# Patient Record
Sex: FEMALE | Race: ASIAN | NOT HISPANIC OR LATINO
[De-identification: names, ages, dates, MRNs, and addresses within clinical notes are randomized per-mention and may not be internally consistent; named-entity substitution may affect disease eponyms.]

---

## 2018-08-08 ENCOUNTER — APPOINTMENT (OUTPATIENT)
Dept: ORTHOPEDIC SURGERY | Facility: CLINIC | Age: 43
End: 2018-08-08
Payer: COMMERCIAL

## 2018-08-08 VITALS
WEIGHT: 133 LBS | BODY MASS INDEX: 22.16 KG/M2 | HEIGHT: 65 IN | HEART RATE: 76 BPM | SYSTOLIC BLOOD PRESSURE: 95 MMHG | DIASTOLIC BLOOD PRESSURE: 62 MMHG

## 2018-08-08 DIAGNOSIS — Z78.9 OTHER SPECIFIED HEALTH STATUS: ICD-10-CM

## 2018-08-08 DIAGNOSIS — Z86.39 PERSONAL HISTORY OF OTHER ENDOCRINE, NUTRITIONAL AND METABOLIC DISEASE: ICD-10-CM

## 2018-08-08 DIAGNOSIS — M25.562 PAIN IN LEFT KNEE: ICD-10-CM

## 2018-08-08 DIAGNOSIS — Z87.09 PERSONAL HISTORY OF OTHER DISEASES OF THE RESPIRATORY SYSTEM: ICD-10-CM

## 2018-08-08 DIAGNOSIS — M84.362A STRESS FRACTURE, LEFT TIBIA, INITIAL ENCOUNTER FOR FRACTURE: ICD-10-CM

## 2018-08-08 PROBLEM — Z00.00 ENCOUNTER FOR PREVENTIVE HEALTH EXAMINATION: Status: ACTIVE | Noted: 2018-08-08

## 2018-08-08 PROCEDURE — 73562 X-RAY EXAM OF KNEE 3: CPT | Mod: LT

## 2018-08-08 PROCEDURE — 99204 OFFICE O/P NEW MOD 45 MIN: CPT

## 2018-08-24 ENCOUNTER — APPOINTMENT (OUTPATIENT)
Dept: ORTHOPEDIC SURGERY | Facility: CLINIC | Age: 43
End: 2018-08-24
Payer: COMMERCIAL

## 2018-08-24 VITALS
WEIGHT: 133 LBS | BODY MASS INDEX: 22.16 KG/M2 | HEART RATE: 72 BPM | SYSTOLIC BLOOD PRESSURE: 97 MMHG | HEIGHT: 65 IN | DIASTOLIC BLOOD PRESSURE: 66 MMHG

## 2018-08-24 DIAGNOSIS — M17.12 UNILATERAL PRIMARY OSTEOARTHRITIS, LEFT KNEE: ICD-10-CM

## 2018-08-24 DIAGNOSIS — M23.304 OTHER MENISCUS DERANGEMENTS, UNSPECIFIED MEDIAL MENISCUS, LEFT KNEE: ICD-10-CM

## 2018-08-24 PROCEDURE — 99214 OFFICE O/P EST MOD 30 MIN: CPT

## 2018-08-24 RX ORDER — TRAMADOL HYDROCHLORIDE 50 MG/1
50 TABLET, COATED ORAL 3 TIMES DAILY
Qty: 90 | Refills: 0 | Status: ACTIVE | COMMUNITY
Start: 2018-08-24 | End: 1900-01-01

## 2018-09-05 PROBLEM — M23.304 DERANGEMENT OF MEDIAL MENISCUS OF LEFT KNEE: Status: ACTIVE | Noted: 2018-09-05

## 2018-09-26 ENCOUNTER — RX RENEWAL (OUTPATIENT)
Age: 43
End: 2018-09-26

## 2018-09-26 RX ORDER — MELOXICAM 15 MG/1
15 TABLET ORAL
Qty: 30 | Refills: 0 | Status: ACTIVE | COMMUNITY
Start: 2018-08-24 | End: 1900-01-01

## 2022-08-01 ENCOUNTER — EMERGENCY (EMERGENCY)
Facility: HOSPITAL | Age: 47
LOS: 1 days | Discharge: HOME | End: 2022-08-01
Attending: EMERGENCY MEDICINE | Admitting: EMERGENCY MEDICINE

## 2022-08-01 VITALS
TEMPERATURE: 98 F | RESPIRATION RATE: 16 BRPM | OXYGEN SATURATION: 100 % | HEART RATE: 75 BPM | SYSTOLIC BLOOD PRESSURE: 113 MMHG | DIASTOLIC BLOOD PRESSURE: 66 MMHG

## 2022-08-01 VITALS
HEART RATE: 90 BPM | RESPIRATION RATE: 18 BRPM | HEIGHT: 65 IN | TEMPERATURE: 98 F | DIASTOLIC BLOOD PRESSURE: 44 MMHG | OXYGEN SATURATION: 98 % | SYSTOLIC BLOOD PRESSURE: 100 MMHG

## 2022-08-01 LAB
ALBUMIN SERPL ELPH-MCNC: 4.3 G/DL — SIGNIFICANT CHANGE UP (ref 3.3–5)
ALP SERPL-CCNC: 106 U/L — SIGNIFICANT CHANGE UP (ref 40–120)
ALT FLD-CCNC: 12 U/L — SIGNIFICANT CHANGE UP (ref 4–33)
ANION GAP SERPL CALC-SCNC: 11 MMOL/L — SIGNIFICANT CHANGE UP (ref 7–14)
AST SERPL-CCNC: 15 U/L — SIGNIFICANT CHANGE UP (ref 4–32)
BASOPHILS # BLD AUTO: 0.05 K/UL — SIGNIFICANT CHANGE UP (ref 0–0.2)
BASOPHILS NFR BLD AUTO: 0.7 % — SIGNIFICANT CHANGE UP (ref 0–2)
BILIRUB SERPL-MCNC: 0.4 MG/DL — SIGNIFICANT CHANGE UP (ref 0.2–1.2)
BUN SERPL-MCNC: 10 MG/DL — SIGNIFICANT CHANGE UP (ref 7–23)
CALCIUM SERPL-MCNC: 9.6 MG/DL — SIGNIFICANT CHANGE UP (ref 8.4–10.5)
CHLORIDE SERPL-SCNC: 105 MMOL/L — SIGNIFICANT CHANGE UP (ref 98–107)
CO2 SERPL-SCNC: 25 MMOL/L — SIGNIFICANT CHANGE UP (ref 22–31)
CREAT SERPL-MCNC: 0.69 MG/DL — SIGNIFICANT CHANGE UP (ref 0.5–1.3)
EGFR: 106 ML/MIN/1.73M2 — SIGNIFICANT CHANGE UP
EOSINOPHIL # BLD AUTO: 0.06 K/UL — SIGNIFICANT CHANGE UP (ref 0–0.5)
EOSINOPHIL NFR BLD AUTO: 0.9 % — SIGNIFICANT CHANGE UP (ref 0–6)
GLUCOSE SERPL-MCNC: 82 MG/DL — SIGNIFICANT CHANGE UP (ref 70–99)
HCT VFR BLD CALC: 36.5 % — SIGNIFICANT CHANGE UP (ref 34.5–45)
HGB BLD-MCNC: 12.8 G/DL — SIGNIFICANT CHANGE UP (ref 11.5–15.5)
IANC: 3.83 K/UL — SIGNIFICANT CHANGE UP (ref 1.8–7.4)
IMM GRANULOCYTES NFR BLD AUTO: 0.1 % — SIGNIFICANT CHANGE UP (ref 0–1.5)
LYMPHOCYTES # BLD AUTO: 2.32 K/UL — SIGNIFICANT CHANGE UP (ref 1–3.3)
LYMPHOCYTES # BLD AUTO: 34.2 % — SIGNIFICANT CHANGE UP (ref 13–44)
MCHC RBC-ENTMCNC: 31.4 PG — SIGNIFICANT CHANGE UP (ref 27–34)
MCHC RBC-ENTMCNC: 35.1 GM/DL — SIGNIFICANT CHANGE UP (ref 32–36)
MCV RBC AUTO: 89.5 FL — SIGNIFICANT CHANGE UP (ref 80–100)
MONOCYTES # BLD AUTO: 0.52 K/UL — SIGNIFICANT CHANGE UP (ref 0–0.9)
MONOCYTES NFR BLD AUTO: 7.7 % — SIGNIFICANT CHANGE UP (ref 2–14)
NEUTROPHILS # BLD AUTO: 3.83 K/UL — SIGNIFICANT CHANGE UP (ref 1.8–7.4)
NEUTROPHILS NFR BLD AUTO: 56.4 % — SIGNIFICANT CHANGE UP (ref 43–77)
NRBC # BLD: 0 /100 WBCS — SIGNIFICANT CHANGE UP
NRBC # FLD: 0 K/UL — SIGNIFICANT CHANGE UP
PLATELET # BLD AUTO: 210 K/UL — SIGNIFICANT CHANGE UP (ref 150–400)
POTASSIUM SERPL-MCNC: 4.4 MMOL/L — SIGNIFICANT CHANGE UP (ref 3.5–5.3)
POTASSIUM SERPL-SCNC: 4.4 MMOL/L — SIGNIFICANT CHANGE UP (ref 3.5–5.3)
PROT SERPL-MCNC: 7.5 G/DL — SIGNIFICANT CHANGE UP (ref 6–8.3)
RBC # BLD: 4.08 M/UL — SIGNIFICANT CHANGE UP (ref 3.8–5.2)
RBC # FLD: 12.8 % — SIGNIFICANT CHANGE UP (ref 10.3–14.5)
SODIUM SERPL-SCNC: 141 MMOL/L — SIGNIFICANT CHANGE UP (ref 135–145)
TROPONIN T, HIGH SENSITIVITY RESULT: <6 NG/L — SIGNIFICANT CHANGE UP
WBC # BLD: 6.79 K/UL — SIGNIFICANT CHANGE UP (ref 3.8–10.5)
WBC # FLD AUTO: 6.79 K/UL — SIGNIFICANT CHANGE UP (ref 3.8–10.5)

## 2022-08-01 PROCEDURE — 73110 X-RAY EXAM OF WRIST: CPT | Mod: 26,LT

## 2022-08-01 PROCEDURE — 73564 X-RAY EXAM KNEE 4 OR MORE: CPT | Mod: 26,RT

## 2022-08-01 PROCEDURE — 99285 EMERGENCY DEPT VISIT HI MDM: CPT

## 2022-08-01 RX ORDER — IBUPROFEN 200 MG
400 TABLET ORAL ONCE
Refills: 0 | Status: COMPLETED | OUTPATIENT
Start: 2022-08-01 | End: 2022-08-01

## 2022-08-01 RX ORDER — ACETAMINOPHEN 500 MG
650 TABLET ORAL ONCE
Refills: 0 | Status: COMPLETED | OUTPATIENT
Start: 2022-08-01 | End: 2022-08-01

## 2022-08-01 RX ADMIN — Medication 400 MILLIGRAM(S): at 10:46

## 2022-08-01 RX ADMIN — Medication 650 MILLIGRAM(S): at 10:46

## 2022-08-01 NOTE — ED PROVIDER NOTE - PHYSICAL EXAMINATION
Right knee: warm to touch with mild swelling and tenderness. limited ROM due to pain. no skin changes.  pulses  intact.   Left wrist: FROM, pulses intact. no skin changes.

## 2022-08-01 NOTE — ED ADULT TRIAGE NOTE - CHIEF COMPLAINT QUOTE
Pt c/o right knee pain x 4 days, denies injury or trauma. Pt also, c/o left wrist pain with movement x few weeks. PMH HLD and bilateral meniscus tear

## 2022-08-01 NOTE — ED PROCEDURE NOTE - PROCEDURE ADDITIONAL DETAILS
Focused ED Ultrasound of ( right    )  lower extremity  [address] - Indication - Leg pain    In both grey scale and color doppler from common femoral vein into superficial and deep femoral vein, and again at the popliteal vein inferiorly past  the trifurcation:  full compression at all sites, with normal doppler flow.  No visualized thrombus.  Recommend repeat ultrasound in 5 - 7 days.     Impression: no proximal DVT (  right   ) lower extremity.  Small R knee effusion seen.   Dexeus [id number]

## 2022-08-01 NOTE — ED PROVIDER NOTE - CLINICAL SUMMARY MEDICAL DECISION MAKING FREE TEXT BOX
48 yo female with right knee pain and left wrist pain. the right knee with swelling and warm to touch. los suspicion for septic joint. most likely inflammatory.   will obtain xray to r/o any lobo pathology of knee and wrist. pain control.

## 2022-08-01 NOTE — ED ADULT NURSE NOTE - OBJECTIVE STATEMENT
Patient is a 48 yo female, h/x HLD, GERD, presenting with R knee pain x 10 days. AAOx4, no signs of distress, denies injury, knee is swollen and warm to touch. Patient also reports weakness, fatigue and dizziness x ~1 month. Also brings with her recent x-ray results on L wrist, states she is waiting for MRI appointment. Denies chest pain, shortness of breath. Medicated for pain. Sent to x-ray. Fall precautions maintained.
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
## 2022-08-01 NOTE — ED PROVIDER NOTE - NS ED ATTENDING STATEMENT MOD
This was a shared visit with the AZAR. I reviewed and verified the documentation and independently performed the documented:

## 2022-08-01 NOTE — ED PROVIDER NOTE - PROGRESS NOTE DETAILS
Pt states she has been having weakness all over her body for the last 2 months. she also endorses having intermittent dizziness not associated with change in position. she states the episodes happen at random and have been improving.   will obtains an EKG, labs ro r/o anemia, trop. Pt reassessed at bedside, feels well, pain controlled. Informed of workup in ED, reviewed lab and/or radiology results (when applicable) with patient/caregiver. Informed pt of plan for discharge with instructions to follow up with PMD. Pt/caregiver expressed understanding of plan and agrees with plan for discharge. Strict return precautions discussed with patient in layman's terms, patient demonstrated understanding of return precautions. Jacek Prater PA-C

## 2022-08-01 NOTE — ED PROVIDER NOTE - PATIENT PORTAL LINK FT
You can access the FollowMyHealth Patient Portal offered by [facility] by registering at the following website: [email or web address]. By joining [email or web address]’s FollowMyHealth portal, you will also be able to view your health information using other applications (apps) compatible with our system.
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
## 2022-08-01 NOTE — ED PROVIDER NOTE - NSFOLLOWUPINSTRUCTIONS_ED_ALL_ED_FT
Advance activity as tolerated.  Continue all previously prescribed medications as directed unless otherwise instructed.  Follow up with your primary care physician in 48-72 hours- bring copies of your results.  Return to the ER for worsening or persistent symptoms, and/or ANY NEW OR CONCERNING SYMPTOMS. If you have issues obtaining follow up, please call: [phone] to obtain a doctor or specialist who takes your insurance in your area.  You may call [phone] to make an appointment with the internal medicine clinic.

## 2022-08-04 ENCOUNTER — APPOINTMENT (OUTPATIENT)
Dept: ORTHOPEDIC SURGERY | Facility: CLINIC | Age: 47
End: 2022-08-04

## 2022-08-04 DIAGNOSIS — M25.561 PAIN IN RIGHT KNEE: ICD-10-CM

## 2022-08-04 PROCEDURE — 99203 OFFICE O/P NEW LOW 30 MIN: CPT

## 2022-08-04 RX ORDER — NAPROXEN 500 MG/1
500 TABLET ORAL
Qty: 50 | Refills: 1 | Status: ACTIVE | COMMUNITY
Start: 2022-08-04 | End: 1900-01-01

## 2022-08-04 NOTE — REVIEW OF SYSTEMS
Left voicemail for patient's family indicating that due to covid-19 we are only seeing urgent patients in clinic. Please call us to coordinate converting the appointment on  5/1/2020 into a video appointment. We need an e-mail address and we need to set up [employer] access. Clinic phone number was provided.        Arely Fermin       [Joint Pain] : joint pain

## 2022-08-11 ENCOUNTER — APPOINTMENT (OUTPATIENT)
Dept: ORTHOPEDIC SURGERY | Facility: CLINIC | Age: 47
End: 2022-08-11

## 2022-08-11 PROBLEM — M25.561 KNEE PAIN, RIGHT: Status: ACTIVE | Noted: 2022-08-11

## 2022-08-11 PROCEDURE — 99214 OFFICE O/P EST MOD 30 MIN: CPT
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
## 2022-08-11 NOTE — PHYSICAL EXAM
[de-identified] : Oriented to time, place, person\par Mood: Normal\par Affect: Normal\par Appearance: Healthy, well appearing, no acute distress.\par Gait: Antalgic \par Assistive Devices: Cane\par \par Right Knee Exam:\par \par Skin: Clean, dry, intact\par Inspection: No obvious malalignment, no masses, no swelling, mild effusion\par Pulses: 2+ DP/PT pulses \par ROM: 0-90 degrees of flexion. Severe pain with knee flexion, apprehensive. \par Tenderness: Hypersensitivity to touch. +MJLT\par Stability: Stable to varus, valgus. Negative Lachman testing. Negative anterior drawer, negative posterior drawer.\par Strength: 5/5 Q/H/TA/GS/EHL, without atrophy\par Neuro: Intact to light touch throughout, DTRs normal\par Additional Tests: Negative Jie's test, Negative patellar grind test  [de-identified] : Images were reviewed from ER dated 8.1.2022\par \par 4 views of the right knee that show no acute fracture or dislocation. There is no medial, no lateral and no patellofemoral degenerative changes seen. There is no significant malalignment. No significant other obvious osseous abnormality, otherwise unremarkable. \par \par MRI right knee dated 8.5.2022 shows small undersurface tear of the medial meniscus. Mild MCL sprain.

## 2022-08-11 NOTE — DISCUSSION/SUMMARY
[de-identified] : 46 y/o female with right knee MMT\par \par Patient presents for follow-up of right knee pain with MRI shows shows small undersurface tear of the medial meniscus as well as evidence of mild irritation of the medial collateral ligament.  We discussed that this tear is peripheral in nature and does not require urgent surgical intervention.  We discussed that conservative management is indicated at this time. \par \par Recommendations: Begin trial of PT, Rx given. Conservative care & observation, this includes rest/activity avoidance until less symptomatic with subsequent gradual return to full activity as tolerated. Patient may also use OTC NSAID's or acetaminophen as tolerated, with application of ice to the area 2-3x daily for 20 minutes after periods of activity. \par \par Follow-up 6-8 weeks.

## 2022-08-11 NOTE — ADDENDUM
[FreeTextEntry1] : This note was written by Gayathri Reyes on 08/04/2022 acting solely as a scribe for Dr. Felipe Araujo.\par \par All medical record entries made by the Scribe were at my, Dr. Felipe Araujo, direction and personally dictated by me on 08/04/2022. I have personally reviewed the chart and agree that the record accurately reflects my personal performance of the history, physical exam, assessment and plan.

## 2022-08-11 NOTE — ADDENDUM
[FreeTextEntry1] : This note was written by Gayathri Reyes on 08/11/2022 acting solely as a scribe for Dr. Felipe Araujo.\par \par All medical record entries made by the Scribe were at my, Dr. Felipe Arajuo, direction and personally dictated by me on 08/11/2022. I have personally reviewed the chart and agree that the record accurately reflects my personal performance of the history, physical exam, assessment and plan.

## 2022-08-11 NOTE — DISCUSSION/SUMMARY
[de-identified] : 48 y/o female with right knee pain. \par \par Patient presents for evaluation of right knee pain. The patient's has hypersensitivity to the knee and examination is limited due to her pain. Discussed with the patient in detail the concern for underlying internal derangement to the meniscus and possible treatment options that may include conservative management (e.g. RICE, activity modification, PT, injection therapy) versus operative arthroscopy. Discussed that treatment would likely depend on the nature of the injury, quality of the chondral surfaces (degree of arthrosis) as seen on MRI imaging.  \par \par Recommendation: Rest, ice, compression, elevation (RICE) and OTC NSAID's as instructed until MRI imaging.\par \par Follow up after MRI.

## 2022-08-11 NOTE — PHYSICAL EXAM
[de-identified] : Oriented to time, place, person\par Mood: Normal\par Affect: Normal\par Appearance: Healthy, well appearing, no acute distress.\par Gait: Normal\par Assistive Devices: None\par \par Right Knee Exam:\par \par Skin: Clean, dry, intact\par Inspection: No obvious malalignment, no masses, no swelling, mild effusion\par Pulses: 2+ DP/PT pulses \par ROM: 0-30 degrees of flexion. Severe pain with knee flexion, apprehensive. \par Tenderness: Hypersensitivity to touch. \par Stability: Stable to varus, valgus. Negative Lachman testing. Negative anterior drawer, negative posterior drawer.\par Strength: 5/5 Q/H/TA/GS/EHL, without atrophy\par Neuro: Intact to light touch throughout, DTRs normal\par Additional Tests: +Jie's test, Negative patellar grind test  [de-identified] : Images were reviewed from ER dated 8.1.2022\par \par 4 views of the right knee were obtained today, 08/04/2022, that show no acute fracture or dislocation. There is no medial, no lateral and no patellofemoral degenerative changes seen. There is no significant malalignment. No significant other obvious osseous abnormality, otherwise unremarkable.

## 2022-09-26 ENCOUNTER — APPOINTMENT (OUTPATIENT)
Dept: ORTHOPEDIC SURGERY | Facility: CLINIC | Age: 47
End: 2022-09-26

## 2022-09-26 VITALS
BODY MASS INDEX: 20.99 KG/M2 | HEIGHT: 65 IN | DIASTOLIC BLOOD PRESSURE: 80 MMHG | SYSTOLIC BLOOD PRESSURE: 100 MMHG | HEART RATE: 72 BPM | WEIGHT: 126 LBS

## 2022-09-26 DIAGNOSIS — S83.231D COMPLEX TEAR OF MEDIAL MENISCUS, CURRENT INJURY, RIGHT KNEE, SUBSEQUENT ENCOUNTER: ICD-10-CM

## 2022-09-26 PROCEDURE — 99213 OFFICE O/P EST LOW 20 MIN: CPT

## 2022-09-30 PROBLEM — S83.231D COMPLEX TEAR OF MEDIAL MENISCUS OF RIGHT KNEE AS CURRENT INJURY, SUBSEQUENT ENCOUNTER: Status: ACTIVE | Noted: 2022-08-11

## 2022-09-30 NOTE — ADDENDUM
[FreeTextEntry1] : This note was written by Gayathri Reyes on 09/26/2022 acting solely as a scribe for Dr. Felipe Araujo.\par \par All medical record entries made by the Scribe were at my, Dr. Felipe Araujo, direction and personally dictated by me on 09/26/2022. I have personally reviewed the chart and agree that the record accurately reflects my personal performance of the history, physical exam, assessment and plan.

## 2022-09-30 NOTE — DISCUSSION/SUMMARY
[de-identified] : 48 y/o female with right knee MMT\par \par Patient presents for follow-up of right knee pain with MRI that shows small undersurface tear of the medial meniscus as well as evidence of mild irritation of the medial collateral ligament.  She reports good relief under the guidance of physical therapy. We discussed that this tear is peripheral in nature and does not require surgical arthroscopy if she is continuing to improve with conservative management. \par \par Recommendations: Continue PT, new Rx given. Gradual discontinuation of cane and brace as able. NSAIDs/Ice prn. \par \par Follow-up 2 months.
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
## 2022-09-30 NOTE — PHYSICAL EXAM
[de-identified] : Oriented to time, place, person\par Mood: Normal\par Affect: Normal\par Appearance: Healthy, well appearing, no acute distress.\par Gait: Antalgic \par Assistive Devices: Cane\par \par Right Knee Exam:\par \par Skin: Clean, dry, intact\par Inspection: No obvious malalignment, no masses, no swelling, mild effusion\par Pulses: 2+ DP/PT pulses \par ROM: 0-115 degrees of flexion. +pain with knee flexion\par Tenderness: Hypersensitivity to touch. +MJLT\par Stability: Stable to varus, valgus. Negative Lachman testing. Negative anterior drawer, negative posterior drawer.\par Strength: 5/5 Q/H/TA/GS/EHL, without atrophy\par Neuro: Intact to light touch throughout, DTRs normal\par Additional Tests: Negative Jie's test, Negative patellar grind test  [de-identified] : Images were reviewed from ER dated 8.1.2022\par \par 4 views of the right knee that show no acute fracture or dislocation. There is no medial, no lateral and no patellofemoral degenerative changes seen. There is no significant malalignment. No significant other obvious osseous abnormality, otherwise unremarkable. \par \par MRI right knee dated 8.5.2022 shows small undersurface tear of the medial meniscus. Mild MCL sprain. We independently reviewed and discussed in detail the images and the radiologic reports with the patient.

## 2022-10-10 ENCOUNTER — EMERGENCY (EMERGENCY)
Facility: HOSPITAL | Age: 47
LOS: 1 days | Discharge: HOME | End: 2022-10-10
Attending: EMERGENCY MEDICINE
Payer: COMMERCIAL

## 2022-10-10 VITALS
SYSTOLIC BLOOD PRESSURE: 132 MMHG | DIASTOLIC BLOOD PRESSURE: 74 MMHG | OXYGEN SATURATION: 99 % | HEART RATE: 86 BPM | HEIGHT: 65 IN | WEIGHT: 128.97 LBS | TEMPERATURE: 100 F | RESPIRATION RATE: 19 BRPM

## 2022-10-10 VITALS
TEMPERATURE: 98 F | RESPIRATION RATE: 18 BRPM | SYSTOLIC BLOOD PRESSURE: 119 MMHG | DIASTOLIC BLOOD PRESSURE: 76 MMHG | HEART RATE: 76 BPM | OXYGEN SATURATION: 99 %

## 2022-10-10 PROCEDURE — 99284 EMERGENCY DEPT VISIT MOD MDM: CPT

## 2022-10-10 RX ORDER — IBUPROFEN 200 MG
400 TABLET ORAL ONCE
Refills: 0 | Status: COMPLETED | OUTPATIENT
Start: 2022-10-10 | End: 2022-10-10

## 2022-10-10 RX ORDER — OXYCODONE HYDROCHLORIDE 5 MG/1
1 TABLET ORAL
Qty: 5 | Refills: 0
Start: 2022-10-10

## 2022-10-10 RX ORDER — OXYCODONE HYDROCHLORIDE 5 MG/1
5 TABLET ORAL ONCE
Refills: 0 | Status: DISCONTINUED | OUTPATIENT
Start: 2022-10-10 | End: 2022-10-10

## 2022-10-10 RX ADMIN — Medication 1 TABLET(S): at 20:25

## 2022-10-10 RX ADMIN — OXYCODONE HYDROCHLORIDE 5 MILLIGRAM(S): 5 TABLET ORAL at 20:15

## 2022-10-10 RX ADMIN — Medication 400 MILLIGRAM(S): at 20:15

## 2022-10-10 NOTE — ED PROVIDER NOTE - PATIENT PORTAL LINK FT
You can access the FollowMyHealth Patient Portal offered by [facility] by registering at the following website: [email or web address]. By joining [employer]’s FollowMyHealth portal, you will also be able to view your health information using other applications (apps) compatible with our system.

## 2022-10-10 NOTE — ED PROVIDER NOTE - ENMT NEGATIVE STATEMENT, MLM
Ears: +Ear pain and muffled hearing as per HPI. Nose: no nasal congestion and no nasal drainage. Mouth/Throat: +Tooth pain as per HPI. no hoarseness and no throat pain. Neck: no lumps, no pain, no stiffness and no swollen glands. Ears: +Ear pain. Nose: no nasal congestion and no nasal drainage. Mouth/Throat: +Tooth pain as per HPI. no hoarseness and no throat pain. Neck: no lumps, no pain, no stiffness and no swollen glands.

## 2022-10-10 NOTE — ED PROVIDER NOTE - NSFOLLOWUPINSTRUCTIONS_ED_ALL_ED_FT
You were seen in the emergency department for dental pain.  Please read all attached patient information, read all additional instructions below, and follow-up with all providers as directed.    1) Follow up with dental office on WEDNESDAY for likely tooth extraction. Call [phone] and tell them you are an emergency department patient. Follow-up with your primary care provider in 2-3 days.    2) Continue to take all medications as prescribed. Take your antibiotics that were prescribed    3) Rest and stay hydrated. Pain can be managed with Acetaminophen (aka Tylenol) and Ibuprofen (aka Motrin or Advil) over the counter as directed.    4) Return to the ER for any new or worsening symptoms.      Please read all attached patient information.        Dental Abscess  WHAT YOU NEED TO KNOW:  What is a dental abscess? A dental abscess is a collection of pus in or around a tooth. A dental abscess is caused by bacteria. The bacteria usually enter the tooth when the enamel (outer part of the tooth) is damaged by tooth decay. Bacteria may also enter the tooth through a break or chip in the tooth, or a cut in the gum. Food particles that are stuck between the teeth for a long time may also lead to an abscess.   What increases my risk for a dental abscess?   •Poor tooth care  •Medical conditions, such as diabetes, gastric reflux, or diseases that weaken the immune system   •Procedures on the tooth or the gums  •Dry mouth or very little saliva   •Smoking or drinking alcohol  •Radiation therapy of the head and neck  •Certain medicines, such as steroids, allergy, or blood pressure medicines  What are the signs and symptoms of a dental abscess?   •Toothache, a loose tooth, or a tooth that is very sensitive to pressure or temperature  •Bad breath, unpleasant taste, and drooling  •Fever  •Pain, redness, and swelling of the gums, or swelling of your face and neck  •Pain when you open or close your mouth  •Trouble opening your mouth  How is a dental abscess diagnosed? Your healthcare provider will examine your teeth and gums. He or she will check for pus, redness, swelling, or a mass. You may need an x-ray to check for infection in deeper tissues or broken teeth.   How is a dental abscess treated? Treatment helps treat your abscess and prevent more serious problems.  •Medicines may be given to treat a bacterial infection and decrease pain.   •Incision and drainage is a cut in the abscess to allow the pus to drain. A sample of fluid may be collected from your abscess. The fluid is sent to a lab and tested for bacteria. Ask your healthcare provider for more information.  •A root canal is a procedure to remove the bacteria and prevent more infection. It is usually done after an incision and drainage. A filling or crown will be placed over the tooth after you have healed from your root canal.   •Tooth removal may be needed if the infection affects deeper tissues. This is usually done after an incision and drainage.   What can I do to care for myself?   •Rinse your mouth every 2 hours with salt water. This will help keep the area clean.   •Gently brush your teeth twice a day with a soft tooth brush. This will help keep the area clean.   •Eat soft foods as directed. Soft foods may cause less pain. Examples include applesauce, yogurt, and cooked pasta. Ask your healthcare provider how long to follow this instruction.   •Apply a warm compress to your tooth or gum. Use a cotton ball or gauze soaked in warm water. Remove the compress in 10 minutes or when it becomes cool. Repeat 3 times a day.   What can I do to prevent another dental abscess?   •Brush your teeth at least 2 times a day with fluoride toothpaste.  •Use dental floss to clean between your teeth at least once a day.  •Rinse your mouth with water or mouthwash after meals and snacks.   •Chew sugarless gum after meals and snacks.  •Limit foods that are sticky and high in sugar such as raisons. Also limit drinks high in sugar, such as soda.   •See your dentist every 6 months for dental cleanings and oral exams.  When should I seek immediate care?   •You have severe pain.  •You have trouble breathing because of pain or swelling.  When should I contact my healthcare provider?   •Your symptoms get worse, even after treatment.  •Your mouth is bleeding.  •You cannot eat or drink because of pain or swelling.  •Your abscess returns.  •You have an injury that causes a crack in your tooth.  •You have questions or concerns about your condition or care.  CARE AGREEMENT:  You have the right to help plan your care. Learn about your health condition and how it may be treated. Discuss treatment options with your healthcare providers to decide what care you want to receive. You always have the right to refuse treatment.

## 2022-10-10 NOTE — ED PROVIDER NOTE - NSFOLLOWUPCLINICS_GEN_ALL_ED_FT
[facility]  Dental  [address] [place] [id number]  Phone: [phone]  Fax:   Scheduled Appointment: 10/12/2022

## 2022-10-10 NOTE — ED PROVIDER NOTE - ENMT, MLM
Airway patent, Nasal mucosa clear. Limited ability to open jaw due to pain. Mouth with dark/black discoloration of L upper posterior molar and swelling of surrounding gingiva.  Throat has no vesicles, no oropharyngeal exudates and uvula is midline. Tympanic membrane without inflammation bilaterally. Airway patent, Nasal mucosa clear.  Throat has no vesicles, no oropharyngeal exudates and uvula is midline. Tympanic membrane without inflammation bilaterally. Moderate pain when opening mouth. +Bristol tooth sprouting under gingiva on the R. No fluctuant mass +tenderness with palpation under right jaw.

## 2022-10-10 NOTE — ED PROVIDER NOTE - PROGRESS NOTE DETAILS
Sindhu Torres: spoke with Dental -- recommended outpatient extraction on Wednesday if no fluctuant mass. Can dc on abx and pain medications Isaiah Montes): 46 y/o F w/ PMHx chronic knee pain p/w 2 days of R sided dental pain. Pt reports no prior similar episode and regular dental hygiene. Pt took Tylenol and had some relief but decided to present here. No drainage. No bleeding. No numbness over mouth. With pain control, pt is able to chew. No smoking. No alcohol. No allergies. Exam notable for well appearing, afebrile, stable vital signs. Oral exam notable for: No TMJ tenderness. Intact oral pharyngeal mucosa and R upper and lower wisdom teeth outgrown but not fully exposed or in malposition. Low concern for abscess or other periapical infection of mouth or wisdom teeth. Plan for oral antibiotics, pain control, IR oxy and refer to dental clinic.

## 2022-10-10 NOTE — ED PROVIDER NOTE - CLINICAL SUMMARY MEDICAL DECISION MAKING FREE TEXT BOX
47 year old woman pmh GERD p/w L upper molar pain x2 days, accompanied by chills, R headache, R ear pain and sensation of heat and muffled hearing in right ear. Vital signs stable, afebrile, but took Tylenol shortly before presentation. Exam shows discoloration and swelling of R upper posterior molar, limited ability to open jaw due to pain. Cranial nerves intact, no focal deficits. Will obtain dental consult. 47 year old woman pmh GERD p/w L upper molar pain x2 days, accompanied by chills, R headache, R ear pain and sensation of heat and muffled hearing in right ear. Vital signs stable, afebrile, but took Tylenol shortly before presentation. Exam shows discoloration and swelling of R upper posterior molar, limited ability to open jaw due to pain. Cranial nerves intact, no focal deficits. Dental abscess vs soft tissue infection. Plan: +ibuprofen and oxycodone. Will obtain dental consult.

## 2022-10-10 NOTE — ED PROVIDER NOTE - SKIN, MLM
Skin normal color for race, warm, dry and intact. No evidence of rash. Skin normal color for race, warm, dry and intact. No evidence of rash. Warm to touch

## 2022-10-10 NOTE — ED PROVIDER NOTE - OBJECTIVE STATEMENT
47 year old female, pmh of GERD who p/w 2 days of R upper molar tooth pain and mouth swelling. She has also been having chills and R-sided headache today. She also reports R ear pain, a sensation of "heat" in her right ear and slightly muffled hearing on the right. No recent history of oral surgery or instrumentation. No hx of dental dz. No recent facial trauma. The pain has prevented her from eating. No nausea/vomiting/diarrhea. No changes in facial sensation. She has never had her wisdom teeth removed. She took Tylenol yesterday with little improvement and took it again in the waiting room. 47 year old female, pmh of GERD who p/w 2 days of R upper molar tooth pain and mouth swelling. She has also been having chills and R-sided headache today. She also reports R ear pain, a sensation of "heat" in her right ear the right. No recent history of oral surgery or instrumentation. No hx of dental dz. No recent facial trauma. The pain has prevented her from eating. No nausea/vomiting/diarrhea. No changes in facial sensation. She has never had her wisdom teeth removed. She took Tylenol yesterday with little improvement and took it again in the waiting room.

## 2022-10-10 NOTE — ED ADULT NURSE NOTE - OBJECTIVE STATEMENT
48y/o F coming to the ED c.o R toothache. 46y/o F coming to the ED c.o R toothache. Pt states that for the past x2 days was experiencing @ upper molar tooth pain & mouth swelling a/w fever & R sided HA. Pt denies any recent oral sx, facial trauma. Pt states took tylenol x2 with relief. On exam, pt is breathing spontaneously, able to speak full sentences w.o difficulty, saturating 98% RA. No drainage noted. VSS.

## 2023-01-10 ENCOUNTER — APPOINTMENT (OUTPATIENT)
Dept: PULMONOLOGY | Facility: CLINIC | Age: 48
End: 2023-01-10

## 2023-10-20 NOTE — ED PROVIDER NOTE - NEURO NEGATIVE STATEMENT, MLM
LM advising pt of day/time of CT.  Oct 27 @ 1pm at [id number] [facility]   +R sided headache. no loss of consciousness, no gait abnormality, no sensory deficits, and no weakness.

## 2023-11-17 ENCOUNTER — EMERGENCY (EMERGENCY)
Facility: HOSPITAL | Age: 48
LOS: 1 days | Discharge: HOME | End: 2023-11-17
Admitting: EMERGENCY MEDICINE
Payer: OTHER MISCELLANEOUS

## 2023-11-17 VITALS
SYSTOLIC BLOOD PRESSURE: 123 MMHG | HEART RATE: 84 BPM | OXYGEN SATURATION: 100 % | DIASTOLIC BLOOD PRESSURE: 78 MMHG | RESPIRATION RATE: 18 BRPM | TEMPERATURE: 98 F

## 2023-11-17 PROCEDURE — 73030 X-RAY EXAM OF SHOULDER: CPT | Mod: 26,LT

## 2023-11-17 PROCEDURE — 99284 EMERGENCY DEPT VISIT MOD MDM: CPT

## 2023-11-17 RX ORDER — IBUPROFEN 200 MG
600 TABLET ORAL ONCE
Refills: 0 | Status: COMPLETED | OUTPATIENT
Start: 2023-11-17 | End: 2023-11-17

## 2023-11-17 RX ADMIN — Medication 600 MILLIGRAM(S): at 16:33

## 2023-11-17 NOTE — ED PROVIDER NOTE - OBJECTIVE STATEMENT
47 y/o F with h/o HLD, pre DM (no meds) pw sudden onset shoulder pain after lifting a heavy box at work today approx 1:30pm. States she works for the [employer]. Denies head trauma, neck pain, back pain, CP, SOB, ab pain, weakness, numbness, tingling. Denies prior injuries to the shoulder. Took Tylenol extra strength when the injury occurred with only mild relief.

## 2023-11-17 NOTE — ED ADULT NURSE NOTE - OBJECTIVE STATEMENT
pt received to [facility] and ambulatory at baseline, c/o L shoulder injury/pain after lifting packages at work at the [employer]. tender to touch, limited ROM from pain, +radial pulses. denies chest pain, sob. no acute distress noted at this time. respirations even and nonlabored on room air. comfort and safety maintained. pt pending XR.

## 2023-11-17 NOTE — ED PROVIDER NOTE - CLINICAL SUMMARY MEDICAL DECISION MAKING FREE TEXT BOX
49 y/o F with h/o HLD, pre DM (no meds) pw sudden onset shoulder pain after lifting a heavy box at work today approx 1:30pm. States she works for the [employer]. Denies head trauma, neck pain, back pain, CP, SOB, ab pain, weakness, numbness, tingling. Denies prior injuries to the shoulder. Took Tylenol extra strength when the injury occurred with only mild relief.  PE: TTP anterior distal shoulder at the rotator cuff insertion. Unable to elevate and abduct. Severe pain. No redness, no swelling, no obvious deformity  Plan: Will check xray shoulder, give Motrin, Ortho outpt follow up ([facility] assisting with appt)  **update: because it is workers comp, patient needs to obtain claim number at work prior to making the Ortho appt 47 y/o F with h/o HLD, pre DM (no meds) pw sudden onset shoulder pain after lifting a heavy box at work today approx 1:30pm. States she works for the [employer]. Denies head trauma, neck pain, back pain, CP, SOB, ab pain, weakness, numbness, tingling. Denies prior injuries to the shoulder. Took Tylenol extra strength when the injury occurred with only mild relief.  PE: TTP anterior distal shoulder at the rotator cuff insertion. Unable to elevate and abduct. Severe pain. No redness, no swelling, no obvious deformity  Plan: Will check xray shoulder, give Motrin, Ortho outpt follow up ([facility] assisting with appt) for further eval and MRI

## 2023-11-17 NOTE — ED PROVIDER NOTE - NSFOLLOWUPINSTRUCTIONS_ED_ALL_ED_FT
Follow up with Ortho within the week- you can call: Find a Physician helpline ([phone]) for assistance   Take Motrin 600mg 1 tab every 6-8 hrs as needed with food for pain.   Keep sling on for support during the day for 1-2 days - remove at night.    Do range of motion exercises during the day.   Worsening pain, numbness, weakness, swelling or new concerning symptoms return to the Emergency Department.     Shoulder Sprain  A shoulder sprain is a partial or complete tear in one of the tough, fiber-like tissues (ligaments) in the shoulder. The ligaments in the shoulder help to hold the shoulder in place.  What are the causes?  This condition may be caused by:  A fall.A hit to the shoulder.A twist of the arm.What increases the risk?  You are more likely to develop this condition if you:  Play sports.Have problems with balance or coordination.What are the signs or symptoms?  Symptoms of this condition include:  Pain when moving the shoulder.Limited ability to move the shoulder.Swelling and tenderness on top of the shoulder.Warmth in the shoulder.A change in the shape of the shoulder.Redness or bruising on the shoulder.How is this diagnosed?  This condition is diagnosed with:  A physical exam. During the exam, you may be asked to do simple exercises with your shoulder.Imaging tests such as X-rays, MRI, or a CT scan. These tests can show how severe the sprain is.How is this treated?  This condition may be treated with:  Rest.Pain medicine.Ice.A sling or brace. This is used to keep the arm still while the shoulder is healing.Physical therapy or rehabilitation exercises. These help to improve the range of motion and strength of the shoulder.Surgery (rare). Surgery may be needed if the sprain caused a joint to become unstable. Surgery may also be needed to reduce pain.Some people may develop ongoing shoulder pain or lose some range of motion in the shoulder. However, most people do not develop long-term problems.  Follow these instructions at home:     If you have a sling or brace:     Wear the sling or brace as told by your health care provider. Remove it only as told by your health care provider.Loosen the sling or brace if your fingers tingle, become numb, or turn cold and blue.Keep the sling or brace clean.If the sling or brace is not waterproof:  Do not let it get wet.Cover it with a watertight covering when you take a bath or shower.Activity     Rest your shoulder.Move your arm only as much as told by your health care provider, but move your hand and fingers often to prevent stiffness and swelling.Return to your normal activities as told by your health care provider. Ask your health care provider what activities are safe for you.Ask your health care provider when it is safe for you to drive if you have a sling or brace on your shoulder.If you were shown how to do any exercises, do them as told by your health care provider.General instructions     If directed, put ice on the affected area.  Put ice in a plastic bag.Place a towel between your skin and the bag.Leave the ice on for 20 minutes, 2–3 times a day.Take over-the-counter and prescription medicines only as told by your health care provider.Do not use any products that contain nicotine or tobacco, such as cigarettes, e-cigarettes, and chewing tobacco. These can delay healing. If you need help quitting, ask your health care provider.Keep all follow-up visits as told by your health care provider. This is important.Contact a health care provider if:  Your pain gets worse.Your pain is not relieved with medicines.You have increased redness or swelling.Get help right away if:  You have a fever.You cannot move your arm or shoulder.You develop severe numbness or tingling in your arm, hand, or fingers.Your arm, hand, or fingers feel cold and turn blue, white, or gray.Summary  A shoulder sprain is a partial or complete tear in one of the tough, fiber-like tissues (ligaments) in the shoulder.This condition may be caused by a fall, a hit to the shoulder, or a twist of the arm.Treatment usually includes rest, ice, and pain medicine as needed.If you have a sling or brace, wear it as told by your health care provider. Remove it only as told by your health care provider. Follow up with Ortho within the week- you can call: Find a Physician helpline ([phone]) for assistance   Take Tylenol 650mg every 4-6 hours as needed for pain   Do range of motion exercises during the day.   Worsening pain, numbness, weakness, swelling or new concerning symptoms return to the Emergency Department.     Shoulder Sprain  A shoulder sprain is a partial or complete tear in one of the tough, fiber-like tissues (ligaments) in the shoulder. The ligaments in the shoulder help to hold the shoulder in place.  What are the causes?  This condition may be caused by:  A fall.A hit to the shoulder.A twist of the arm.What increases the risk?  You are more likely to develop this condition if you:  Play sports.Have problems with balance or coordination.What are the signs or symptoms?  Symptoms of this condition include:  Pain when moving the shoulder.Limited ability to move the shoulder.Swelling and tenderness on top of the shoulder.Warmth in the shoulder.A change in the shape of the shoulder.Redness or bruising on the shoulder.How is this diagnosed?  This condition is diagnosed with:  A physical exam. During the exam, you may be asked to do simple exercises with your shoulder.Imaging tests such as X-rays, MRI, or a CT scan. These tests can show how severe the sprain is.How is this treated?  This condition may be treated with:  Rest.Pain medicine.Ice.A sling or brace. This is used to keep the arm still while the shoulder is healing.Physical therapy or rehabilitation exercises. These help to improve the range of motion and strength of the shoulder.Surgery (rare). Surgery may be needed if the sprain caused a joint to become unstable. Surgery may also be needed to reduce pain.Some people may develop ongoing shoulder pain or lose some range of motion in the shoulder. However, most people do not develop long-term problems.  Follow these instructions at home:     If you have a sling or brace:     Wear the sling or brace as told by your health care provider. Remove it only as told by your health care provider.Loosen the sling or brace if your fingers tingle, become numb, or turn cold and blue.Keep the sling or brace clean.If the sling or brace is not waterproof:  Do not let it get wet.Cover it with a watertight covering when you take a bath or shower.Activity     Rest your shoulder.Move your arm only as much as told by your health care provider, but move your hand and fingers often to prevent stiffness and swelling.Return to your normal activities as told by your health care provider. Ask your health care provider what activities are safe for you.Ask your health care provider when it is safe for you to drive if you have a sling or brace on your shoulder.If you were shown how to do any exercises, do them as told by your health care provider.General instructions     If directed, put ice on the affected area.  Put ice in a plastic bag.Place a towel between your skin and the bag.Leave the ice on for 20 minutes, 2–3 times a day.Take over-the-counter and prescription medicines only as told by your health care provider.Do not use any products that contain nicotine or tobacco, such as cigarettes, e-cigarettes, and chewing tobacco. These can delay healing. If you need help quitting, ask your health care provider.Keep all follow-up visits as told by your health care provider. This is important.Contact a health care provider if:  Your pain gets worse.Your pain is not relieved with medicines.You have increased redness or swelling.Get help right away if:  You have a fever.You cannot move your arm or shoulder.You develop severe numbness or tingling in your arm, hand, or fingers.Your arm, hand, or fingers feel cold and turn blue, white, or gray.Summary  A shoulder sprain is a partial or complete tear in one of the tough, fiber-like tissues (ligaments) in the shoulder.This condition may be caused by a fall, a hit to the shoulder, or a twist of the arm.Treatment usually includes rest, ice, and pain medicine as needed.If you have a sling or brace, wear it as told by your health care provider. Remove it only as told by your health care provider.

## 2023-11-17 NOTE — ED ADULT TRIAGE NOTE - CHIEF COMPLAINT QUOTE
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 Tender to touch. Patient denies chest pain. PHX HLD.

## 2023-11-17 NOTE — ED PROVIDER NOTE - LOCATION
TTP anterior distal should at the rotator cuff insertion. Unable to elevate and abduct. Severe pain. No redness, no swelling, no obvious deformity/shoulder

## 2023-11-17 NOTE — ED PROVIDER NOTE - PATIENT PORTAL LINK FT
You can access the FollowMyHealth Patient Portal offered by [facility] by registering at the following website: [email or web address]. By joining [employer]’s FollowMyHealth portal, you will also be able to view your health information using other applications (apps) compatible with our system.

## 2023-11-21 ENCOUNTER — APPOINTMENT (OUTPATIENT)
Dept: ORTHOPEDIC SURGERY | Facility: CLINIC | Age: 48
End: 2023-11-21
Payer: OTHER GOVERNMENT

## 2023-11-21 VITALS
WEIGHT: 122 LBS | SYSTOLIC BLOOD PRESSURE: 102 MMHG | HEIGHT: 65 IN | BODY MASS INDEX: 20.33 KG/M2 | OXYGEN SATURATION: 98 % | DIASTOLIC BLOOD PRESSURE: 78 MMHG | HEART RATE: 83 BPM

## 2023-11-21 DIAGNOSIS — S46.002A UNSPECIFIED INJURY OF MUSCLE(S) AND TENDON(S) OF THE ROTATOR CUFF OF LEFT SHOULDER, INITIAL ENCOUNTER: ICD-10-CM

## 2023-11-21 DIAGNOSIS — S49.92XA UNSPECIFIED INJURY OF LEFT SHOULDER AND UPPER ARM, INITIAL ENCOUNTER: ICD-10-CM

## 2023-11-21 PROCEDURE — 99204 OFFICE O/P NEW MOD 45 MIN: CPT | Mod: 25

## 2023-11-21 PROCEDURE — A4565: CPT

## 2023-11-25 ENCOUNTER — APPOINTMENT (OUTPATIENT)
Dept: MRI IMAGING | Facility: IMAGING CENTER | Age: 48
End: 2023-11-25

## 2023-11-27 ENCOUNTER — NON-APPOINTMENT (OUTPATIENT)
Age: 48
End: 2023-11-27

## 2023-11-27 RX ORDER — ALPRAZOLAM 0.5 MG/1
0.5 TABLET ORAL
Qty: 2 | Refills: 0 | Status: ACTIVE | COMMUNITY
Start: 2023-11-27 | End: 1900-01-01

## 2023-12-04 ENCOUNTER — APPOINTMENT (OUTPATIENT)
Dept: ORTHOPEDIC SURGERY | Facility: CLINIC | Age: 48
End: 2023-12-04
Payer: OTHER GOVERNMENT

## 2023-12-04 DIAGNOSIS — S43.432A SUPERIOR GLENOID LABRUM LESION OF LEFT SHOULDER, INITIAL ENCOUNTER: ICD-10-CM

## 2023-12-04 PROCEDURE — 99214 OFFICE O/P EST MOD 30 MIN: CPT

## 2023-12-07 ENCOUNTER — APPOINTMENT (OUTPATIENT)
Dept: ORTHOPEDIC SURGERY | Facility: CLINIC | Age: 48
End: 2023-12-07
Payer: OTHER GOVERNMENT

## 2023-12-07 PROCEDURE — 99204 OFFICE O/P NEW MOD 45 MIN: CPT

## 2024-01-18 ENCOUNTER — APPOINTMENT (OUTPATIENT)
Dept: ORTHOPEDIC SURGERY | Facility: CLINIC | Age: 49
End: 2024-01-18
Payer: OTHER GOVERNMENT

## 2024-01-18 DIAGNOSIS — M25.512 PAIN IN LEFT SHOULDER: ICD-10-CM

## 2024-01-18 PROCEDURE — 99213 OFFICE O/P EST LOW 20 MIN: CPT

## 2024-01-22 PROBLEM — M25.512 LEFT SHOULDER PAIN: Status: ACTIVE | Noted: 2023-12-07

## 2024-01-22 NOTE — ADDENDUM
[FreeTextEntry1] : This note was written by Gayathri Reyes on 01/18/2024 acting solely as a scribe for Dr. Felipe Araujo.  All medical record entries made by the Scribe were at my, Dr. Felipe Araujo, direction and personally dictated by me on 01/18/2024. I have personally reviewed the chart and agree that the record accurately reflects my personal performance of the history, physical exam, assessment and plan.

## 2024-01-22 NOTE — HISTORY OF PRESENT ILLNESS
[de-identified] : 49 year old F who presents for follow up of  left shoulder pain and weakness. Patient is attending PT 3 x per week and notes improvement of pain an ROM. The pain is constant worse with movement of the arm. Diclofenac is helpful. 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

## 2024-01-22 NOTE — PHYSICAL EXAM
[de-identified] : Left shoulder exam:   Inspection: No malalignment, No defects, No atrophy Skin: No masses, No lesions Neck: Negative Spurling, full ROM, no pain with ROM AROM: FF to 90, abduction to 55, ER to 30, IR to lower lumbar Painful arc ROM: Pain with active and passive motion Tenderness: No bicipital tenderness, +tenderness to greater tuberosity/RTC insertion, +anterior shoulder/lesser tuberosity tenderness Strength: 4+/5 ER, 4+/5 IR in adduction, 4/5 supraspinatus testing, +[place]'s test AC joint: No TTP/pain with cross arm testing Biceps: Speed Negative, Yergason Negative Impingement test: +Thomas, +Neer Vasc: 2+ radial pulse Stability: Stable Neuro: AIN, PIN, Ulnar nerve intact to motor Sensation: Intact to light touch throughout Other findings: None.  [de-identified] : Images were reviewed dated 11/17/2023   3 views of left shoulder that show no acute fracture or dislocation. There is no glenohumeral and no AC joint degenerative change seen. Type II acromion. There is no significant malalignment. No significant other obvious osseous abnormality, otherwise unremarkable.   MRI left shoulder dated 11/28/2023 shows tear of the posterior labrum, SLAP. RTC tendinosis. Mild AC joint arthrosis.   We independently reviewed and discussed in detail the images and the radiologic reports with the patient.

## 2024-01-22 NOTE — DISCUSSION/SUMMARY
[de-identified] : 48 y/o female with left shoulder pain.   Patient presents for f/u of her left shoulder injury. She reports significant improvement of pain and function under the gudiance of PT.  There appears to be continued clinical dysfunction with inability to raise her hand, and discussion was centered upon concern for current function and progression to adhesive capsulitis.  MRI is relatively benign that shows evidence of degenerative superior labral pathology and rotator cuff tendinosis that may be consistent with early tendinosis. I continued to recommend conservative management.   Recommendations: Continue PT, Rx given. Conservative modalities as above (overhead activity rest/activity avoidance until less symptomatic, ice, NSAIDs if able, strengthening and conditioning program.   Followup 3 months.

## 2024-04-04 ENCOUNTER — APPOINTMENT (OUTPATIENT)
Dept: ORTHOPEDIC SURGERY | Facility: CLINIC | Age: 49
End: 2024-04-04
Payer: OTHER GOVERNMENT

## 2024-04-04 VITALS — WEIGHT: 130 LBS | BODY MASS INDEX: 21.66 KG/M2 | HEIGHT: 65 IN

## 2024-04-04 PROCEDURE — 99213 OFFICE O/P EST LOW 20 MIN: CPT

## 2024-05-01 NOTE — DISCUSSION/SUMMARY
[de-identified] : 48 y/o female with left shoulder adhesive capsulitis.   Patient presents for f/u of her left shoulder adhesive capsulitis. She reports improvement of pain and function under the gudiance of PT, but she continues to have significant loss of motion and restriction in activity.  Discussion was centered upon ability to work in a certain environment with moderate restriction.   Recommendations: Continue PT, Rx given. HEP. Work restrictions as advised.   Followup 3 months.

## 2024-05-01 NOTE — HISTORY OF PRESENT ILLNESS
[de-identified] : 49 year old F who presents for follow up of  left shoulder pain and weakness. Patient is attending PT 3 x per week and notes some improvement of pain and ROM. Patient would like to return to work with restriction. The pain is constant worse with movement of the arm. Diclofenac is helpful. Patient works in post office.   The patient's past medical history, past surgical history, medications and allergies were reviewed by me today with the patient and documented accordingly. In addition, the patient's family and social history, which were noncontributory to this visit, were reviewed also.

## 2024-05-01 NOTE — PHYSICAL EXAM
[de-identified] : Left shoulder exam:   Inspection: No malalignment, No defects, No atrophy Skin: No masses, No lesions Neck: Negative Spurling, full ROM, no pain with ROM AROM: FF to 90, abduction to 55, ER to 30, IR to lower lumbar Painful arc ROM: Pain with active and passive motion Tenderness: No bicipital tenderness, +tenderness to greater tuberosity/RTC insertion, +anterior shoulder/lesser tuberosity tenderness Strength: 4+/5 ER, 4+/5 IR in adduction, 4/5 supraspinatus testing, +[place]'s test AC joint: No TTP/pain with cross arm testing Biceps: Speed Negative, Yergason Negative Impingement test: +Thomas, +Neer Vasc: 2+ radial pulse Stability: Stable Neuro: AIN, PIN, Ulnar nerve intact to motor Sensation: Intact to light touch throughout Other findings: None.  [de-identified] : Images were reviewed dated 11/17/2023   3 views of left shoulder that show no acute fracture or dislocation. There is no glenohumeral and no AC joint degenerative change seen. Type II acromion. There is no significant malalignment. No significant other obvious osseous abnormality, otherwise unremarkable.   MRI left shoulder dated 11/28/2023 shows tear of the posterior labrum, SLAP. RTC tendinosis. Mild AC joint arthrosis.   We independently reviewed and discussed in detail the images and the radiologic reports with the patient.

## 2024-05-02 ENCOUNTER — APPOINTMENT (OUTPATIENT)
Dept: ORTHOPEDIC SURGERY | Facility: CLINIC | Age: 49
End: 2024-05-02
Payer: OTHER GOVERNMENT

## 2024-05-02 DIAGNOSIS — M75.02 ADHESIVE CAPSULITIS OF LEFT SHOULDER: ICD-10-CM

## 2024-05-02 PROCEDURE — 99213 OFFICE O/P EST LOW 20 MIN: CPT

## 2024-06-12 PROBLEM — M75.02 ADHESIVE CAPSULITIS OF LEFT SHOULDER: Status: ACTIVE | Noted: 2024-05-01

## 2024-06-12 NOTE — DISCUSSION/SUMMARY
[de-identified] : 50 y/o female with left shoulder adhesive capsulitis.   Patient presents for f/u of her left shoulder adhesive capsulitis. She reports no significant improvement of pain and function under the gudiance of PT, and she continues to have significant loss of motion and restriction in activity.  Discussion was centered upon ability to work in a certain environment with moderate restriction.   Recommendations: Continue PT, Rx given. HEP. Work restrictions as advised.  Diclofenac prescription was renewed.  Followup 2mos

## 2024-06-12 NOTE — HISTORY OF PRESENT ILLNESS
[de-identified] : 49 year old F who presents for follow up of left shoulder pain and weakness. Patient is attending PT 3 x per week, states that pain and ROM has not changed since last visit. Patient has returned to limited duty. The pain is constant worse with movement of the arm. Diclofenac is helpful. Patient works in post office.

## 2024-06-12 NOTE — PHYSICAL EXAM
[de-identified] : Left shoulder exam:   Inspection: No malalignment, No defects, No atrophy Skin: No masses, No lesions Neck: Negative Spurling, full ROM, no pain with ROM AROM: FF to 90, abduction to 55, ER to 30, IR to lower lumbar Painful arc ROM: Pain with active and passive motion Tenderness: No bicipital tenderness, +tenderness to greater tuberosity/RTC insertion, +anterior shoulder/lesser tuberosity tenderness Strength: 4+/5 ER, 4+/5 IR in adduction, 4/5 supraspinatus testing, +[place]'s test AC joint: No TTP/pain with cross arm testing Biceps: Speed Negative, Yergason Negative Impingement test: +Thomas, +Neer Vasc: 2+ radial pulse Stability: Stable Neuro: AIN, PIN, Ulnar nerve intact to motor Sensation: Intact to light touch throughout Other findings: None.  [de-identified] : abduction: 65 strength maintained [de-identified] : Images were reviewed dated 11/17/2023   3 views of left shoulder that show no acute fracture or dislocation. There is no glenohumeral and no AC joint degenerative change seen. Type II acromion. There is no significant malalignment. No significant other obvious osseous abnormality, otherwise unremarkable.   MRI left shoulder dated 11/28/2023 shows tear of the posterior labrum, SLAP. RTC tendinosis. Mild AC joint arthrosis.   We independently reviewed and discussed in detail the images and the radiologic reports with the patient.

## 2024-06-13 ENCOUNTER — APPOINTMENT (OUTPATIENT)
Dept: ORTHOPEDIC SURGERY | Facility: CLINIC | Age: 49
End: 2024-06-13

## 2024-06-13 PROCEDURE — 99213 OFFICE O/P EST LOW 20 MIN: CPT

## 2024-06-13 RX ORDER — DICLOFENAC SODIUM 50 MG/1
50 TABLET, DELAYED RELEASE ORAL
Qty: 30 | Refills: 0 | Status: ACTIVE | COMMUNITY
Start: 2023-11-21 | End: 1900-01-01

## 2024-06-13 NOTE — PHYSICAL EXAM
[de-identified] : abduction: 65 strength maintained [de-identified] : Left shoulder exam:   Inspection: No malalignment, No defects, No atrophy Skin: No masses, No lesions Neck: Negative Spurling, full ROM, no pain with ROM AROM: FF to 100, abduction to 70, ER to 30, IR to lower lumbar Painful arc ROM: Pain with active and passive motion Tenderness: No bicipital tenderness, +tenderness to greater tuberosity/RTC insertion, +anterior shoulder/lesser tuberosity tenderness Strength: 4+/5 ER, 4+/5 IR in adduction, 4/5 supraspinatus testing, +[place]'s test AC joint: No TTP/pain with cross arm testing Biceps: Speed Negative, Yergason Negative Impingement test: +Thomas, +Neer Vasc: 2+ radial pulse Stability: Stable Neuro: AIN, PIN, Ulnar nerve intact to motor Sensation: Intact to light touch throughout Other findings: None.  [de-identified] : Images were reviewed dated 11/17/2023   3 views of left shoulder that show no acute fracture or dislocation. There is no glenohumeral and no AC joint degenerative change seen. Type II acromion. There is no significant malalignment. No significant other obvious osseous abnormality, otherwise unremarkable.   MRI left shoulder dated 11/28/2023 shows tear of the posterior labrum, SLAP. RTC tendinosis. Mild AC joint arthrosis.   We independently reviewed and discussed in detail the images and the radiologic reports with the patient.

## 2024-06-13 NOTE — HISTORY OF PRESENT ILLNESS
[de-identified] :  A 49-year-old female presents for a follow-up regarding left shoulder pain and weakness. The patient is currently undergoing physical therapy three times per week, reporting that while her range of motion (ROM) has improved, the pain has intensified. Despite this, she has been able to return to limited duty. The pain is described as constant and exacerbated by movement of the arm. Diclofenac provides relief. 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

## 2024-06-13 NOTE — DISCUSSION/SUMMARY
[de-identified] : 50 y/o female with left shoulder adhesive capsulitis.   Patient presents for f/u of her left shoulder adhesive capsulitis. She reports some improvement of pain and function under the guidance of PT, and she continues to have significant loss of motion and restriction in activity.  Clinical exam findings are relatively similar to the last visit showing minimal improvement. Discussion was centered upon ability to work in a certain environment with moderate restriction as well as physical therapy focused on internal rotation and forward elevation.   Work: Post  Disability status: Temporarily partially disabled Goal of RTW: Immediately with restrictions Date of RTW: Patient has already returned to work.   Recommendations: Continue PT, Rx given. HEP. Work restrictions as advised. Rest/Ice, Diclofenac Rx given  Follow-up 4-6 weeks

## 2024-07-25 ENCOUNTER — APPOINTMENT (OUTPATIENT)
Dept: ORTHOPEDIC SURGERY | Facility: CLINIC | Age: 49
End: 2024-07-25

## 2024-08-15 ENCOUNTER — APPOINTMENT (OUTPATIENT)
Dept: ORTHOPEDIC SURGERY | Facility: CLINIC | Age: 49
End: 2024-08-15

## 2024-08-15 VITALS — BODY MASS INDEX: 21.16 KG/M2 | HEIGHT: 65 IN | WEIGHT: 127 LBS

## 2024-08-15 PROCEDURE — 99213 OFFICE O/P EST LOW 20 MIN: CPT

## 2024-08-15 NOTE — PHYSICAL EXAM
[de-identified] : Left shoulder exam:   Inspection: No malalignment, No defects, No atrophy Skin: No masses, No lesions Neck: Negative Spurling, full ROM, no pain with ROM AROM: FF to 120, abduction to 70, ER to 30, IR to mid lumbar Painful arc ROM: No with active and passive motion Tenderness: No bicipital tenderness, +tenderness to greater tuberosity/RTC insertion, +anterior shoulder/lesser tuberosity tenderness Strength: 5/5 ER, 5/5 IR in adduction, 4/5 supraspinatus testing, +[place]'s test AC joint: No TTP/pain with cross arm testing Biceps: Speed Negative, Yergason Negative Impingement test: +Thomas, +Neer Vasc: 2+ radial pulse Stability: Stable Neuro: AIN, PIN, Ulnar nerve intact to motor Sensation: Intact to light touch throughout Other findings: None.  [de-identified] : abduction: 65 strength maintained [de-identified] : Images were reviewed dated 11/17/2023   3 views of left shoulder that show no acute fracture or dislocation. There is no glenohumeral and no AC joint degenerative change seen. Type II acromion. There is no significant malalignment. No significant other obvious osseous abnormality, otherwise unremarkable.   MRI left shoulder dated 11/28/2023 shows tear of the posterior labrum, SLAP. RTC tendinosis. Mild AC joint arthrosis.   We independently reviewed and discussed in detail the images and the radiologic reports with the patient.

## 2024-08-15 NOTE — HISTORY OF PRESENT ILLNESS
[de-identified] :  A 49-year-old female presents for a follow-up regarding left shoulder pain and weakness. The patient is currently undergoing physical therapy three times per week, reporting that while her range of motion (ROM) has improved. The pain is described as constant and exacerbated by movement of the arm. Diclofenac provides relief. 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

## 2024-08-15 NOTE — ADDENDUM
[FreeTextEntry1] : This note was written by Faustina Blum on 08/15/2024 acting solely as a scribe for Dr. Felipe Araujo.  All medical record entries made by the Scribe were at my, Dr. Felipe Araujo, direction and personally dictated by me on 08/15/2024. I have personally reviewed the chart and agree that the record accurately reflects my personal performance of the history, physical exam, assessment and plan.

## 2024-08-15 NOTE — DISCUSSION/SUMMARY
[de-identified] : 50 y/o female with left shoulder adhesive capsulitis.   Patient presents for f/u of her left shoulder adhesive capsulitis. She reports some improvement of pain and function under the guidance of PT, and she continues to have significant loss of motion and restriction in activity.  Clinical exam findings show there is improvement in ROM and strength. I expressed that I believe this has turned a corner in a positive direction. Discussion was centered upon ability to work in a certain environment with a decrease in the restrictions imposed   Work: Post  Disability status: Temporarily partially disabled Goal of RTW: Immediately with restrictions Date of RTW: Patient has already returned to work.   Recommendations: HEP. Work restrictions as advised. Rest/Ice, Diclofenac Rx given Ice/rest prn.   Follow-up 4-6 weeks

## 2024-10-03 ENCOUNTER — APPOINTMENT (OUTPATIENT)
Dept: ORTHOPEDIC SURGERY | Facility: CLINIC | Age: 49
End: 2024-10-03

## 2024-10-03 VITALS — HEIGHT: 65 IN | BODY MASS INDEX: 21.16 KG/M2 | WEIGHT: 127 LBS

## 2024-10-03 PROCEDURE — 99213 OFFICE O/P EST LOW 20 MIN: CPT

## 2024-10-03 NOTE — HISTORY OF PRESENT ILLNESS
[de-identified] : 49-year-old female presents for a follow-up regarding left shoulder pain and weakness. The patient is currently undergoing physical therapy three times per week, reporting that while her range of motion (ROM) has greatly improved. She reports pain following physical therapy and home exercise. Diclofenac provides relief. Notably, the patient works in the post office.

## 2024-10-03 NOTE — DISCUSSION/SUMMARY
[de-identified] : 48 y/o female with left shoulder adhesive capsulitis.   Patient presents for f/u of her left shoulder adhesive capsulitis. She reports improvement of pain and function under the guidance of PT, and she clinically has improved since last visit.  I expressed that I believe this is almost at a close as she is close to terminal motion, with the goal of reaching terminal motion soon. I explained that continued pain throughout this process is normal. Discussion was centered upon continued ability to work in a certain environment with a decrease in the restrictions imposed   Work: Post  Disability status: Temporarily partially disabled Goal of RTW: Patient has returned with restrictions Date of RTW: Patient has already returned to work.   Recommendations: HEP. Work restrictions as advised. Rest/Ice, Diclofenac Rx given Ice/rest prn.   Follow-up 4-6 weeks

## 2024-10-03 NOTE — PHYSICAL EXAM
[de-identified] : Left shoulder exam:   Inspection: No malalignment, No defects, No atrophy Skin: No masses, No lesions Neck: Negative Spurling, full ROM, no pain with ROM AROM: FF to 140, abduction to 80, ER to 40, IR to upper lumbar Painful arc ROM: No with active and passive motion Tenderness: No bicipital tenderness, +tenderness to greater tuberosity/RTC insertion, +anterior shoulder/lesser tuberosity tenderness Strength: 5/5 ER, 5/5 IR in adduction, 4/5 supraspinatus testing, +[place]'s test AC joint: No TTP/pain with cross arm testing Biceps: Speed Negative, Yergason Negative Impingement test: +Thomas, +Neer Vasc: 2+ radial pulse Stability: Stable Neuro: AIN, PIN, Ulnar nerve intact to motor Sensation: Intact to light touch throughout Other findings: None.  [de-identified] : abduction: 65 strength maintained [de-identified] : Images were reviewed dated 11/17/2023   3 views of left shoulder that show no acute fracture or dislocation. There is no glenohumeral and no AC joint degenerative change seen. Type II acromion. There is no significant malalignment. No significant other obvious osseous abnormality, otherwise unremarkable.   MRI left shoulder dated 11/28/2023 shows tear of the posterior labrum, SLAP. RTC tendinosis. Mild AC joint arthrosis.   We independently reviewed and discussed in detail the images and the radiologic reports with the patient.

## 2024-10-03 NOTE — ADDENDUM
[FreeTextEntry1] : This note was written by Faustina Blum on 10/03/2024 acting solely as a scribe for Dr. Felipe Araujo.   All medical record entries made by the Scribe were at my, Dr. Felipe Araujo, direction and personally dictated by me on 10/03/2024. I have personally reviewed the chart and agree that the record accurately reflects my personal performance of the history, physical exam, assessment and plan.

## 2024-11-14 ENCOUNTER — APPOINTMENT (OUTPATIENT)
Dept: ORTHOPEDIC SURGERY | Facility: CLINIC | Age: 49
End: 2024-11-14

## 2024-11-14 VITALS — WEIGHT: 127 LBS | BODY MASS INDEX: 21.16 KG/M2 | HEIGHT: 65 IN

## 2024-11-14 DIAGNOSIS — M75.02 ADHESIVE CAPSULITIS OF LEFT SHOULDER: ICD-10-CM

## 2024-11-14 PROCEDURE — 99213 OFFICE O/P EST LOW 20 MIN: CPT

## 2025-01-23 ENCOUNTER — APPOINTMENT (OUTPATIENT)
Dept: ORTHOPEDIC SURGERY | Facility: CLINIC | Age: 50
End: 2025-01-23

## 2025-01-23 VITALS — WEIGHT: 129 LBS | HEIGHT: 65 IN | BODY MASS INDEX: 21.49 KG/M2

## 2025-01-23 PROCEDURE — 99213 OFFICE O/P EST LOW 20 MIN: CPT

## 2025-03-13 ENCOUNTER — APPOINTMENT (OUTPATIENT)
Dept: ORTHOPEDIC SURGERY | Facility: CLINIC | Age: 50
End: 2025-03-13

## 2025-03-13 VITALS — BODY MASS INDEX: 21.33 KG/M2 | WEIGHT: 128 LBS | HEIGHT: 65 IN

## 2025-03-13 PROCEDURE — 99213 OFFICE O/P EST LOW 20 MIN: CPT

## 2025-05-22 ENCOUNTER — APPOINTMENT (OUTPATIENT)
Dept: ORTHOPEDIC SURGERY | Facility: CLINIC | Age: 50
End: 2025-05-22
Payer: OTHER GOVERNMENT

## 2025-05-22 DIAGNOSIS — M75.02 ADHESIVE CAPSULITIS OF LEFT SHOULDER: ICD-10-CM

## 2025-05-22 PROCEDURE — 99213 OFFICE O/P EST LOW 20 MIN: CPT

## 2025-07-15 ENCOUNTER — APPOINTMENT (OUTPATIENT)
Dept: ORTHOPEDIC SURGERY | Facility: CLINIC | Age: 50
End: 2025-07-15
Payer: OTHER GOVERNMENT

## 2025-07-15 PROCEDURE — 99213 OFFICE O/P EST LOW 20 MIN: CPT

## 2025-09-18 ENCOUNTER — APPOINTMENT (OUTPATIENT)
Dept: ORTHOPEDIC SURGERY | Facility: CLINIC | Age: 50
End: 2025-09-18